# Patient Record
Sex: FEMALE | Race: BLACK OR AFRICAN AMERICAN | Employment: UNEMPLOYED | URBAN - METROPOLITAN AREA
[De-identification: names, ages, dates, MRNs, and addresses within clinical notes are randomized per-mention and may not be internally consistent; named-entity substitution may affect disease eponyms.]

---

## 2024-03-29 ENCOUNTER — HOSPITAL ENCOUNTER (EMERGENCY)
Age: 24
Discharge: HOME | End: 2024-03-29
Payer: COMMERCIAL

## 2024-03-29 ENCOUNTER — APPOINTMENT (OUTPATIENT)
Dept: CT IMAGING | Age: 24
End: 2024-03-29
Payer: COMMERCIAL

## 2024-03-29 VITALS
RESPIRATION RATE: 18 BRPM | OXYGEN SATURATION: 97 % | HEART RATE: 115 BPM | DIASTOLIC BLOOD PRESSURE: 80 MMHG | BODY MASS INDEX: 34.15 KG/M2 | TEMPERATURE: 97.8 F | WEIGHT: 200 LBS | SYSTOLIC BLOOD PRESSURE: 138 MMHG | HEIGHT: 64 IN

## 2024-03-29 DIAGNOSIS — S83.8X1A SPRAIN OF OTHER LIGAMENT OF RIGHT KNEE, INITIAL ENCOUNTER: Primary | ICD-10-CM

## 2024-03-29 PROCEDURE — 73700 CT LOWER EXTREMITY W/O DYE: CPT

## 2024-03-29 PROCEDURE — 6370000000 HC RX 637 (ALT 250 FOR IP): Performed by: PHYSICIAN ASSISTANT

## 2024-03-29 PROCEDURE — 99284 EMERGENCY DEPT VISIT MOD MDM: CPT

## 2024-03-29 RX ORDER — IBUPROFEN 600 MG/1
600 TABLET ORAL ONCE
Status: COMPLETED | OUTPATIENT
Start: 2024-03-29 | End: 2024-03-29

## 2024-03-29 RX ORDER — OXYCODONE HYDROCHLORIDE AND ACETAMINOPHEN 5; 325 MG/1; MG/1
1 TABLET ORAL EVERY 6 HOURS PRN
Qty: 12 TABLET | Refills: 0 | Status: SHIPPED | OUTPATIENT
Start: 2024-03-29 | End: 2024-04-01

## 2024-03-29 RX ORDER — OXYCODONE HYDROCHLORIDE AND ACETAMINOPHEN 5; 325 MG/1; MG/1
1 TABLET ORAL ONCE
Status: COMPLETED | OUTPATIENT
Start: 2024-03-29 | End: 2024-03-29

## 2024-03-29 RX ORDER — IBUPROFEN 600 MG/1
600 TABLET ORAL 3 TIMES DAILY PRN
Qty: 30 TABLET | Refills: 0 | Status: SHIPPED | OUTPATIENT
Start: 2024-03-29

## 2024-03-29 RX ADMIN — OXYCODONE AND ACETAMINOPHEN 1 TABLET: 5; 325 TABLET ORAL at 20:46

## 2024-03-29 RX ADMIN — IBUPROFEN 600 MG: 600 TABLET, FILM COATED ORAL at 18:47

## 2024-03-29 ASSESSMENT — PAIN - FUNCTIONAL ASSESSMENT: PAIN_FUNCTIONAL_ASSESSMENT: 0-10

## 2024-03-29 ASSESSMENT — PAIN SCALES - GENERAL
PAINLEVEL_OUTOF10: 8
PAINLEVEL_OUTOF10: 8
PAINLEVEL_OUTOF10: 9

## 2024-03-29 NOTE — ED PROVIDER NOTES
to her decrease in range of motion with significant pain around the knee joint concerning for tibial plateau type injury.  For this reason CT scan of the right knee without contrast was ordered but did not show any acute osseous abnormalities.  Still concern for possible ligamentous or meniscus injury.  Will have her follow-up with orthopedics as an outpatient.  She was given knee brace and crutches to use until she can follow-up with orthopedics.  She was given a dose of ibuprofen while here in the emergency department will be discharged with anti-inflammatory medication and referral for orthopedics.    I am the Primary Clinician of Record.    FINAL IMPRESSION      1. Sprain of other ligament of right knee, initial encounter          DISPOSITION/PLAN     DISPOSITION Decision To Discharge 03/29/2024 08:32:10 PM      PATIENT REFERRED TO:  [facility]  [address] [place] [id number]  Schedule an appointment as soon as possible for a visit in 1 week  recheck      DISCHARGE MEDICATIONS:  New Prescriptions    IBUPROFEN (ADVIL;MOTRIN) 600 MG TABLET    Take 1 tablet by mouth 3 times daily as needed for Pain    OXYCODONE-ACETAMINOPHEN (PERCOCET) 5-325 MG PER TABLET    Take 1 tablet by mouth every 6 hours as needed for Pain for up to 3 days. Intended supply: 3 days. Take lowest dose possible to manage pain Max Daily Amount: 4 tablets       DISCONTINUED MEDICATIONS:  Discontinued Medications    No medications on file              (Please note that portions of this note were completed with a voice recognition program.  Efforts were made to edit the dictations but occasionally words are mis-transcribed.)    GABINO Dumont (electronically signed)            Glenn Child PA  03/29/24 2047

## 2024-03-29 NOTE — ED TRIAGE NOTES
Patient oriented to room and ED throughput process.  Safety measures with ED bed locked in lowest position and call light in reach.  Patient educated on all orders, including any medications.  Patient educated on chief complaint/symptoms. Patient encouraged to ask questions regarding care, medications or treatment plan.  Patient aware of how to reach staff with questions/concerns.\

## 2024-03-30 NOTE — DISCHARGE INSTRUCTIONS
Follow up with your primary care provider in one week for a recheck.    Follow up with orthopedics.     Wear knee brace for the next week and use crutches until you see orthopedics.     Take medications as prescribed.    Return to the ED if you have any worsening symptoms.

## 2024-03-30 NOTE — ED NOTES
Discharge and education instructions reviewed. Patient verbalized understanding, teach-back successful. Patient denied questions at this time. No acute distress noted. Patient instructed to follow-up as noted - return to emergency department if symptoms worsen. Patient verbalized understanding. Discharged per EDMD with discharged instructions.     Patient verbalized and demonstrated understanding of proper use of crutches.

## 2024-04-03 ENCOUNTER — OFFICE VISIT (OUTPATIENT)
Dept: ORTHOPEDIC SURGERY | Age: 24
End: 2024-04-03
Payer: COMMERCIAL

## 2024-04-03 VITALS — BODY MASS INDEX: 34.15 KG/M2 | RESPIRATION RATE: 16 BRPM | WEIGHT: 200 LBS | HEIGHT: 64 IN

## 2024-04-03 DIAGNOSIS — S83.281A ACUTE LATERAL MENISCUS TEAR OF RIGHT KNEE, INITIAL ENCOUNTER: ICD-10-CM

## 2024-04-03 DIAGNOSIS — M25.561 ACUTE PAIN OF RIGHT KNEE: Primary | ICD-10-CM

## 2024-04-03 PROCEDURE — 99204 OFFICE O/P NEW MOD 45 MIN: CPT | Performed by: PHYSICIAN ASSISTANT

## 2024-04-03 SDOH — HEALTH STABILITY: PHYSICAL HEALTH: ON AVERAGE, HOW MANY DAYS PER WEEK DO YOU ENGAGE IN MODERATE TO STRENUOUS EXERCISE (LIKE A BRISK WALK)?: 2 DAYS

## 2024-04-03 SDOH — HEALTH STABILITY: PHYSICAL HEALTH: ON AVERAGE, HOW MANY MINUTES DO YOU ENGAGE IN EXERCISE AT THIS LEVEL?: 20 MIN

## 2024-04-03 NOTE — PROGRESS NOTES
stress stable    Valgus stress stable    Lachman stable    Posterior Drawer stable        Imaging:  Images were personally reviewed by myself and discussed with the patient  CT KNEE RIGHT WO CONTRAST  Order: [id number]  Status: Final result       Visible to patient: Yes (seen)       Next appt: None    0 Result Notes  Details    Reading Physician Reading Date Result Priority   Andrew Rudolph MD  [phone] 3/29/2024      Narrative & Impression  EXAMINATION:  CT OF THE RIGHT KNEE WITHOUT CONTRAST 3/29/2024 7:49 pm     TECHNIQUE:  CT of the right knee was performed without the administration of intravenous  contrast.  Multiplanar reformatted images are provided for review. Automated  exposure control, iterative reconstruction, and/or weight based adjustment of  the mA/kV was utilized to reduce the radiation dose to as low as reasonably  achievable.     COMPARISON:  None.     HISTORY  ORDERING SYSTEM PROVIDED HISTORY: pain, inability to bear weight, pain with  flexion, jumping injury  TECHNOLOGIST PROVIDED HISTORY:  Reason for exam:->pain, inability to bear weight, pain with flexion, jumping  injury  Decision Support Exception - unselect if not a suspected or confirmed  emergency medical condition->Emergency Medical Condition (MA)  Reason for Exam: pain, inability to bear weight, pain with flexion, jumping  injury     FINDINGS:  Bones: No fracture or dislocation.  No abnormal lucent or sclerotic osseous  lesion.     Soft Tissue: The cruciate ligaments are grossly intact.  The extensor  mechanism is intact.  The visualized musculature is unremarkable.  No  abnormal soft tissue mass or fluid collection.     Joint: No joint effusion or popliteal cyst.  No significant degenerative  changes.     IMPRESSION:  No acute osseous abnormality identified.     ^ My interpretation of the above images demonstrate no acute fractures or dislocations.  Joint spaces are well-maintained.  No loose bodies appreciated.    Right knee 4 views

## 2024-04-10 ENCOUNTER — TELEPHONE (OUTPATIENT)
Dept: ORTHOPEDIC SURGERY | Age: 24
End: 2024-04-10

## 2024-04-10 NOTE — TELEPHONE ENCOUNTER
General Question     Subject: INQUIRY OF MRI RESULTS  Patient and /or Facility Request: REBECCA COLON  Contact Number: [phone]    PATIENT CALLED TO SPEAK WITH CLINICAL AND INQUIRE IF MRI RESULTS FROM [facility] HAS BEEN RECEIVED.    SHE STATED THAT SHE COMPLETED THE TEST LAST THURSDAY 4/4.    PLEASE ADVISE

## 2024-04-12 ENCOUNTER — OFFICE VISIT (OUTPATIENT)
Dept: ORTHOPEDIC SURGERY | Age: 24
End: 2024-04-12

## 2024-04-12 VITALS — BODY MASS INDEX: 34.15 KG/M2 | WEIGHT: 200 LBS | HEIGHT: 64 IN

## 2024-04-12 DIAGNOSIS — S89.91XA INJURY OF POSTEROLATERAL CORNER OF KNEE, RIGHT, INITIAL ENCOUNTER: ICD-10-CM

## 2024-04-12 DIAGNOSIS — T14.8XXA BONE BRUISE: Primary | ICD-10-CM

## 2024-04-12 NOTE — PROGRESS NOTES
condition->Emergency Medical Condition (MA)  Reason for Exam: pain, inability to bear weight, pain with flexion, jumping  injury     FINDINGS:  Bones: No fracture or dislocation.  No abnormal lucent or sclerotic osseous  lesion.     Soft Tissue: The cruciate ligaments are grossly intact.  The extensor  mechanism is intact.  The visualized musculature is unremarkable.  No  abnormal soft tissue mass or fluid collection.     Joint: No joint effusion or popliteal cyst.  No significant degenerative  changes.     IMPRESSION:  No acute osseous abnormality identified.          MRI right knee without contrast from [facility]  Exam date April 4, 2024  Full report scanned under media tab  Radiologist read hairline fracture of the posterior lateral tibia.  No step-off at the articular surface.  High-grade tear and laxity of the popliteal fibular ligament.  No retraction.  Intact popliteus tendon.  Additionally, low-grade strain of the conjoined tendon.  No lateral meniscus tear.      ^^SY interpretation: Bone bruise involving the posterolateral tibial plateau with hairline fracture.  Medial and lateral menisci are intact.  MCL intact, ACL and PCL are intact.  The posterolateral corner is overall intact as well.  The iliotibial band, biceps femoris tendon, lateral collateral ligament, and popliteus tendon are visualized.  I cannot visualize the popliteal fibular ligament tear read by the radiologist.        Labs:  No results for input(s): \"WBC\", \"HGB\", \"HCT\", \"MCV\", \"PLT\" in the last 720 hours.  Lab Results   Component Value Date     09/15/2020    K 5.0 09/15/2020     09/15/2020    CO2 24 09/15/2020    BUN 9 09/15/2020    CREATININE 0.6 09/15/2020    GLUCOSE 104 (H) 09/15/2020    CALCIUM 9.7 09/15/2020    PROT 8.2 09/15/2020    LABALBU 4.6 09/15/2020    BILITOT 0.5 09/15/2020    ALKPHOS 94 09/15/2020    AST 14 (L) 09/15/2020    ALT 15 09/15/2020    LABGLOM >60 09/15/2020    GFRAA >60 09/15/2020    AGRATIO 1.3 09/15/2020

## 2024-04-18 ENCOUNTER — TELEPHONE (OUTPATIENT)
Dept: ORTHOPEDIC SURGERY | Age: 24
End: 2024-04-18

## 2024-04-18 NOTE — TELEPHONE ENCOUNTER
General Question     Subject: REFERRAL FOR PHY THERAPY  Patient and /or Facility Request: Nellie Cardenas   Contact Number: [phone]     PT REQ REFERRAL FOR PHY THERAPY REFERRAL SENT TO [facility] PHY THERAPY FAX# [phone]

## 2024-05-09 ENCOUNTER — TELEPHONE (OUTPATIENT)
Dept: ORTHOPEDIC SURGERY | Age: 24
End: 2024-05-09

## 2024-05-09 NOTE — TELEPHONE ENCOUNTER
Called and asked where she was faxing info she gave me the # and I explained that I have not received anything.  She will fax to the [phone]